# Patient Record
Sex: MALE | Race: WHITE | Employment: OTHER | URBAN - METROPOLITAN AREA
[De-identification: names, ages, dates, MRNs, and addresses within clinical notes are randomized per-mention and may not be internally consistent; named-entity substitution may affect disease eponyms.]

---

## 2020-03-03 ENCOUNTER — HOSPITAL ENCOUNTER (EMERGENCY)
Age: 65
Discharge: ANOTHER HOSPITAL | End: 2020-03-04
Attending: EMERGENCY MEDICINE
Payer: MEDICAID

## 2020-03-03 ENCOUNTER — APPOINTMENT (OUTPATIENT)
Dept: CT IMAGING | Age: 65
End: 2020-03-03
Payer: MEDICAID

## 2020-03-03 ENCOUNTER — APPOINTMENT (OUTPATIENT)
Dept: GENERAL RADIOLOGY | Age: 65
End: 2020-03-03
Payer: MEDICAID

## 2020-03-03 VITALS
TEMPERATURE: 97.9 F | OXYGEN SATURATION: 96 % | SYSTOLIC BLOOD PRESSURE: 187 MMHG | HEART RATE: 71 BPM | DIASTOLIC BLOOD PRESSURE: 89 MMHG | RESPIRATION RATE: 14 BRPM | HEIGHT: 70 IN

## 2020-03-03 LAB
A/G RATIO: 1.6 (ref 1.1–2.2)
ALBUMIN SERPL-MCNC: 4.1 G/DL (ref 3.4–5)
ALP BLD-CCNC: 113 U/L (ref 40–129)
ALT SERPL-CCNC: 27 U/L (ref 10–40)
ANION GAP SERPL CALCULATED.3IONS-SCNC: 10 MMOL/L (ref 3–16)
APTT: 32.5 SEC (ref 24.2–36.2)
AST SERPL-CCNC: 26 U/L (ref 15–37)
BASOPHILS ABSOLUTE: 0.1 K/UL (ref 0–0.2)
BASOPHILS RELATIVE PERCENT: 1.2 %
BILIRUB SERPL-MCNC: 0.3 MG/DL (ref 0–1)
BUN BLDV-MCNC: 8 MG/DL (ref 7–20)
CALCIUM SERPL-MCNC: 8.9 MG/DL (ref 8.3–10.6)
CHLORIDE BLD-SCNC: 97 MMOL/L (ref 99–110)
CO2: 28 MMOL/L (ref 21–32)
CREAT SERPL-MCNC: 1 MG/DL (ref 0.8–1.3)
EKG ATRIAL RATE: 60 BPM
EKG DIAGNOSIS: NORMAL
EKG P AXIS: 44 DEGREES
EKG P-R INTERVAL: 142 MS
EKG Q-T INTERVAL: 444 MS
EKG QRS DURATION: 104 MS
EKG QTC CALCULATION (BAZETT): 444 MS
EKG R AXIS: 0 DEGREES
EKG T AXIS: 24 DEGREES
EKG VENTRICULAR RATE: 60 BPM
EOSINOPHILS ABSOLUTE: 0.4 K/UL (ref 0–0.6)
EOSINOPHILS RELATIVE PERCENT: 4.3 %
GFR AFRICAN AMERICAN: >60
GFR NON-AFRICAN AMERICAN: >60
GLOBULIN: 2.6 G/DL
GLUCOSE BLD-MCNC: 230 MG/DL (ref 70–99)
GLUCOSE BLD-MCNC: 264 MG/DL (ref 70–99)
HCT VFR BLD CALC: 42.8 % (ref 40.5–52.5)
HEMOGLOBIN: 14.4 G/DL (ref 13.5–17.5)
INR BLD: 0.97 (ref 0.86–1.14)
LYMPHOCYTES ABSOLUTE: 2.3 K/UL (ref 1–5.1)
LYMPHOCYTES RELATIVE PERCENT: 25.3 %
MCH RBC QN AUTO: 30.6 PG (ref 26–34)
MCHC RBC AUTO-ENTMCNC: 33.7 G/DL (ref 31–36)
MCV RBC AUTO: 90.8 FL (ref 80–100)
MONOCYTES ABSOLUTE: 0.7 K/UL (ref 0–1.3)
MONOCYTES RELATIVE PERCENT: 7.7 %
NEUTROPHILS ABSOLUTE: 5.6 K/UL (ref 1.7–7.7)
NEUTROPHILS RELATIVE PERCENT: 61.5 %
PDW BLD-RTO: 13.1 % (ref 12.4–15.4)
PERFORMED ON: ABNORMAL
PLATELET # BLD: 220 K/UL (ref 135–450)
PMV BLD AUTO: 8.5 FL (ref 5–10.5)
POTASSIUM SERPL-SCNC: 4.3 MMOL/L (ref 3.5–5.1)
PROTHROMBIN TIME: 11.2 SEC (ref 10–13.2)
RBC # BLD: 4.71 M/UL (ref 4.2–5.9)
SODIUM BLD-SCNC: 135 MMOL/L (ref 136–145)
TOTAL PROTEIN: 6.7 G/DL (ref 6.4–8.2)
WBC # BLD: 9.1 K/UL (ref 4–11)

## 2020-03-03 PROCEDURE — 70450 CT HEAD/BRAIN W/O DYE: CPT

## 2020-03-03 PROCEDURE — 93005 ELECTROCARDIOGRAM TRACING: CPT | Performed by: EMERGENCY MEDICINE

## 2020-03-03 PROCEDURE — 6360000004 HC RX CONTRAST MEDICATION: Performed by: EMERGENCY MEDICINE

## 2020-03-03 PROCEDURE — 85730 THROMBOPLASTIN TIME PARTIAL: CPT

## 2020-03-03 PROCEDURE — 80053 COMPREHEN METABOLIC PANEL: CPT

## 2020-03-03 PROCEDURE — 70496 CT ANGIOGRAPHY HEAD: CPT

## 2020-03-03 PROCEDURE — 71045 X-RAY EXAM CHEST 1 VIEW: CPT

## 2020-03-03 PROCEDURE — 99285 EMERGENCY DEPT VISIT HI MDM: CPT

## 2020-03-03 PROCEDURE — 85025 COMPLETE CBC W/AUTO DIFF WBC: CPT

## 2020-03-03 PROCEDURE — 6370000000 HC RX 637 (ALT 250 FOR IP): Performed by: EMERGENCY MEDICINE

## 2020-03-03 PROCEDURE — 85610 PROTHROMBIN TIME: CPT

## 2020-03-03 RX ORDER — GABAPENTIN 100 MG/1
100 CAPSULE ORAL ONCE
Status: COMPLETED | OUTPATIENT
Start: 2020-03-03 | End: 2020-03-03

## 2020-03-03 RX ORDER — ROPINIROLE 0.5 MG/1
0.25 TABLET, FILM COATED ORAL ONCE
Status: COMPLETED | OUTPATIENT
Start: 2020-03-03 | End: 2020-03-03

## 2020-03-03 RX ORDER — NICOTINE 21 MG/24HR
1 PATCH, TRANSDERMAL 24 HOURS TRANSDERMAL DAILY
Status: DISCONTINUED | OUTPATIENT
Start: 2020-03-03 | End: 2020-03-04 | Stop reason: HOSPADM

## 2020-03-03 RX ADMIN — ROPINIROLE HYDROCHLORIDE 0.25 MG: 0.5 TABLET, FILM COATED ORAL at 18:56

## 2020-03-03 RX ADMIN — IOPAMIDOL 75 ML: 755 INJECTION, SOLUTION INTRAVENOUS at 13:27

## 2020-03-03 RX ADMIN — GABAPENTIN 100 MG: 100 CAPSULE ORAL at 18:56

## 2020-03-03 NOTE — ED PROVIDER NOTES
[facility]  ED PROVIDER NOTE    Patient Identification  Pt Name: Jonathan Palacios  MRN: [id number]  [place] 1955  Date of evaluation: 3/3/2020  Provider: Lake Carrera MD  PCP: Nathaniel Livingston MD    Chief Complaint  Facial Droop (at noon started with slurred speech, facial droop on left side, and left side numbness. Currently back to baseline. )      HPI  (History provided by patient and spouse/SO)  This is a 62 y.o. male who was brought in by self for sudden onset of left facial numbness, left facial droop, and slurred speech. He was at [facility] when this happened. It occurred less than 1 hour prior to arrival.  By the time he arrived here, his symptoms completely resolved. He denies any numbness, tingling, or focal weakness. His speech is back to normal, confirmed by both the patient and his wife. He is not having confusion or vision changes/loss. He denies headache, nausea, and vomiting. He is not feeling dizzy. He has not had gait disturbances coordination deficits. He has had similar symptoms in the past and was told he was having TIAs. He was told he had a blockage in an artery in his brain that was too distal to fix. He denies having any associated chest pain, shortness of breath, sweating, lightheadedness, or other symptoms. ROS   10 systems reviewed, pertinent positives/negatives per HPI otherwise noted to be negative. I have reviewed the following nursing documentation:  Allergies: Diazepam    Past medical history:   Past Medical History:   Diagnosis Date    Hyperlipidemia     Hypertension     TIA (transient ischemic attack)      Past surgical history:   Past Surgical History:   Procedure Laterality Date    ABDOMEN SURGERY         Home medications:   Previous Medications    No medications on file       Social history:  reports that he has been smoking. He has been smoking about 0.50 packs per day. He has never used smokeless tobacco. He reports current alcohol use. He reports that he does not use drugs. Family history:  No family history on file. Exam  ED Triage Vitals   BP Temp Temp Source Pulse Resp SpO2 Height Weight   03/03/20 1309 03/03/20 1305 03/03/20 1305 03/03/20 1305 03/03/20 1305 03/03/20 1305 03/03/20 1305 --   123/71 98.4 °F (36.9 °C) Oral 60 18 97 % 5' 10\" (1.778 m)      Nursing note and vitals reviewed. Constitutional: Well developed, well nourished. Non-toxic in appearance. HENT:      Head: Normocephalic and atraumatic. Ears: External ears normal.      Nose: Nose normal.     Mouth: Membrane mucosa moist and pink. Eyes: Anicteric sclera. No discharge. No visual field deficits as tested by confrontation in each eye individually. Neck: Supple. Trachea midline. Cardiovascular: RRR; no murmurs, rubs, or gallops. Pulmonary/Chest: Effort normal. No respiratory distress. CTAB. No stridor. No wheezes. No rales. Abdominal: Soft. No distension. Musculoskeletal: Moves all extremities. No gross deformity. Neurological: Alert and oriented to person, place, time, and situation. CN II-XII intact as tested. Strength 5/5 in upper and lower extremities bilaterally. Normal reflexes. No pronator drift. Normal sensation to light touch. Normal finger-nose bilaterally. Normal heel-shin bilaterally. No speech difficulties or slurring. Skin: Warm and dry. No rash. Psychiatric: Normal mood and affect. Behavior is normal.    EKG  The Ekg interpreted by me in the absence of a cardiologist shows. normal sinus rhythm with a rate of 60  Axis is   Left axis deviation  QTc is  normal  Intervals and Durations are unremarkable. No specific ST-T wave changes appreciated. No evidence of acute ischemia. No significant change from prior EKG dated 9/30/2012    Radiology  XR CHEST PORTABLE   Final Result   1. Coarse reticular opacities throughout the lungs, slightly more pronounced   since 2012. Findings are favored to represent chronic lung changes.    Superimposed INTRACRANIAL HEMORRHAGE,SUBARACHNOID HEMORRHAGE, SUBDURAL HEMATOMA, STROKE, TIA, MENINGITIS, ENCEPHALITIS, SPINAL CORD INJURY/COMPRESSION/LESION, VERTEBROBASILAR INSUFFICIENCY    The total Critical Care time is 45 minutes which excludes separately billable procedures. Final Impression  1. Transient ischemic attack, acute    2. Vertebral artery occlusion, right    3. More than 50 percent stenosis of right internal carotid artery        Blood pressure (!) 167/87, pulse 69, temperature 97.9 °F (36.6 °C), temperature source Oral, resp. rate 18, height 5' 10\" (1.778 m), SpO2 98 %. Disposition:  Transfer    This chart was generated using the [address] [place] [address] dictation system. I created this record but it may contain dictation errors given the limitations of this technology.         Deanna Burrell MD  03/03/20 3597

## 2020-03-04 NOTE — ED NOTES
transfer center called @ 2025   stated \"still waiting for a bed to be cleaned like we told you before will call you when it is done\"  Charge RN made aware.             Jacey Cole  03/03/20 2101

## 2020-03-04 NOTE — ED NOTES
Called  transfer center @ 1910 for bed update. Transfer center stated they are waiting for bed to be cleaned and will call us when is ready.      Jacey Cole  03/03/20 1921

## 2020-03-04 NOTE — ED NOTES
Bedside report to [facility] transport. Patient departed from ED in stable condition. IV in RAC remains in place.      Aranza Walters RN  03/03/20 5100

## 2020-05-19 NOTE — ED NOTES
transfer center called @ 2139 with bed assignment and report number. Paperwork, ETA of [facility], and report number given to RN. Pt updated of time.      Jacey Cole  03/03/20 7422 This visit was conducted using telemedicine with live interactive video and audio   Patient verbally consents to conduct video visit   Patient understands and accepts financial responsibility for any deductible, co-insurance and/or co-pays associated with LITE TEST In Vitro Strip USE TWICE DAILY AS DIRECTED (Patient not taking: Reported on 2/18/2020) 100 strip 0   • FREESTYLE SYSTEM Does not apply Kit 1 each by Does not apply route as needed for Other.  (Patient not taking: Reported on 2/18/2020 ) 1 kit 0 high risk has uncontrolled dm and still working   · Advised to self isolate for 2 days and until results of covid 19 is back   · symptomatic treatment   · Avoid nsaids until results of COVID test   · Starting Augmentin x 10 days       Meds & Refills for th

## 2021-10-15 NOTE — ED NOTES
1 - called  stroke team per consult  Re: multiple partial occlusions in patient with TIA today  1512 - Dr Ruy Gardner called back to speak with Dr Lily Oviedo  03/03/20 1001 Referring Physician (Optional): Kali Sánchez MD

## 2022-08-29 ENCOUNTER — OFFICE VISIT (OUTPATIENT)
Dept: ORTHOPEDIC SURGERY | Age: 67
End: 2022-08-29
Payer: MEDICARE

## 2022-08-29 VITALS — HEIGHT: 70 IN

## 2022-08-29 DIAGNOSIS — M25.552 BILATERAL HIP PAIN: Primary | ICD-10-CM

## 2022-08-29 DIAGNOSIS — M25.551 BILATERAL HIP PAIN: Primary | ICD-10-CM

## 2022-08-29 PROCEDURE — 99203 OFFICE O/P NEW LOW 30 MIN: CPT | Performed by: ORTHOPAEDIC SURGERY

## 2022-08-29 PROCEDURE — 1123F ACP DISCUSS/DSCN MKR DOCD: CPT | Performed by: ORTHOPAEDIC SURGERY

## 2022-08-29 NOTE — Clinical Note
Sending this patient your way for evaluation of his right hip and determine whether or not he may be a candidate for DEEPALI.   Thanks

## 2022-08-30 NOTE — PROGRESS NOTES
8/29/2022     Reason for visit:  Bilateral hip pain    History of Present Illness: The patient is a 81-year-old male who presents for evaluation of his bilateral hips. He reports that he has had pain for several months to years. The right side bothers him more so than the left. No traumatic injury. He has been treated with activity modification, anti-inflammatory medications, and physical therapy but has remained symptomatic. The pain is daily made worse with standing, walking, stairs. He localizes the pain to the anterior and deep aspect of the hip as well as the groin region. Medical History:  Past Medical History:   Diagnosis Date    Hyperlipidemia     Hypertension     TIA (transient ischemic attack)       Past Surgical History:   Procedure Laterality Date    ABDOMINAL SURGERY        No family history on file. Social History     Socioeconomic History    Marital status:      Spouse name: Not on file    Number of children: Not on file    Years of education: Not on file    Highest education level: Not on file   Occupational History    Not on file   Tobacco Use    Smoking status: Every Day     Packs/day: 0.50     Types: Cigarettes    Smokeless tobacco: Never   Substance and Sexual Activity    Alcohol use: Yes     Comment: socially    Drug use: Never    Sexual activity: Not on file   Other Topics Concern    Not on file   Social History Narrative    Not on file     Social Determinants of Health     Financial Resource Strain: Not on file   Food Insecurity: Not on file   Transportation Needs: Not on file   Physical Activity: Not on file   Stress: Not on file   Social Connections: Not on file   Intimate Partner Violence: Not on file   Housing Stability: Not on file      No current outpatient medications on file prior to visit. No current facility-administered medications on file prior to visit.       Allergies   Allergen Reactions    Diazepam         Review of Systems:  Constitutional: Patient is adequately groomed with no evidence of malnutrition  Mental Status: The patient is oriented to time, place and person. The patient's mood and affect are appropriate. Lymphatic: The lymphatic examination bilaterally reveals all areas to be without enlargement or induration. Vascular: Examination reveals no swelling or calf tenderness. Peripheral pulses are palpable and 2+. Neurological: The patient has good coordination. There is no weakness or sensory deficit. Skin:  Head/Neck: inspection reveals no rashes, ulcerations or lesions. Trunk: inspection reveals no rashes, ulcerations or lesions. Objective:  Ht 5' 10\" (1.778 m)      Physical Exam:  The patient is well-appearing and in no apparent distress  Examination of the left and right hips  Range of motion reveals 90 degrees of flexion with internal rotation of 5 to 10 degrees which causes pain, external rotation of 30 to 40 degrees, no trochanteric tenderness  5 out of 5 strength throughout distal muscle groups  Sensation is intact to light touch throughout all distributions  There is no calf swelling or tenderness  Palpable DP pulse, brisk cap refill, 2+ symmetric reflexes     Imaging:  AP pelvis x-ray as well as 2 view x-rays of the right and left hips were reviewed. There is no Acacian. Irregularity of the right femoral head consistent with suspected subchondral collapse likely as result of AVN. Left hip sphericity is maintained. Assessment:  Bilateral hip pain. Right worse than left. X-ray evidence of degenerative joint disease as result of likely subchondral collapse due to AVN    Plan:  Discussed with the patient the diagnosis and treatment options. We spent time reviewing the imaging findings. Given the subchondral collapse I do feel that the only treatment option that would benefit the patient at this point would be a total hip arthroplasty on the right side. He does understand this.   As result I will send him to [facility] for further evaluation and determine whether or not he may be a candidate for surgical intervention. Greater than 30 minutes were spent with this encounter. Time spent included evaluating the patient's chart prior to arrival.  Evaluating the patient in the office including history, physical examination, imaging reviewing, and counseling on next steps. Lastly, time was spent discussing orders with my staff as well as providing documentation in the chart. Lizzie Phelps MD            Orthopaedic Surgery Sports Medicine and [address] [facility] and [address] [facility] Physician [facility] ([place]      Disclaimer: This note was dictated with voice recognition software. Though review and correction are routine, we apologize for any errors.

## 2022-09-09 ENCOUNTER — OFFICE VISIT (OUTPATIENT)
Dept: ORTHOPEDIC SURGERY | Age: 67
End: 2022-09-09
Payer: MEDICARE

## 2022-09-09 VITALS — HEIGHT: 70 IN | WEIGHT: 189 LBS | BODY MASS INDEX: 27.06 KG/M2

## 2022-09-09 DIAGNOSIS — M25.551 BILATERAL HIP PAIN: Primary | ICD-10-CM

## 2022-09-09 DIAGNOSIS — M87.059 AVASCULAR NECROSIS OF FEMUR, UNSPECIFIED LATERALITY (HCC): ICD-10-CM

## 2022-09-09 DIAGNOSIS — M25.552 BILATERAL HIP PAIN: Primary | ICD-10-CM

## 2022-09-09 PROCEDURE — 1123F ACP DISCUSS/DSCN MKR DOCD: CPT | Performed by: ORTHOPAEDIC SURGERY

## 2022-09-09 PROCEDURE — 99213 OFFICE O/P EST LOW 20 MIN: CPT | Performed by: ORTHOPAEDIC SURGERY

## 2022-09-09 NOTE — PROGRESS NOTES
Dr Stella Montiel      Date /Time 9/9/2022       12:54 PM EDT  Name Divine Kemp             1955   Location  [facility]  MRN [id number]                Chief Complaint   Patient presents with    Hip Pain     B/l hips        History of Present Illness    Divine Kemp is a 73 y.o. male who presents with  bilateral hip pain. Sent in consultation by Tania Caballero MD,. Athletic/exercise activity: no sports. Injury Mechanism:  none. Worker's Comp. & legal issues:   none. Previous Treatments: Ice, Heat, and NSAIDs    Patient presents the office today for a new problem. Patient is here with a chief complaint of right greater than left hip pain. He has had pain for many years. No specific injury or trauma. Pain concentrated over his groin and lateral hip. Patient has had very extensive past medical history including but not limited to smoker, multiple cardiac and lower extremity stents, Plavix, TIA, diabetic, peripheral vascular disease, and intracranial aneurysms. Patient has already been informed by a another surgeon at [facility] that he is simply not a surgical candidate. Past History  Past Medical History:   Diagnosis Date    Hyperlipidemia     Hypertension     TIA (transient ischemic attack)      Past Surgical History:   Procedure Laterality Date    ABDOMEN SURGERY       History reviewed. No pertinent family history. Social History     Tobacco Use    Smoking status: Every Day     Packs/day: 0.50     Types: Cigarettes    Smokeless tobacco: Never   Substance Use Topics    Alcohol use: Yes     Comment: socially      No current outpatient medications on file prior to visit. No current facility-administered medications on file prior to visit. ASCVD 10-YEAR RISK SCORE  The ASCVD Risk score (Chiquis Gregory, et al., 2013) failed to calculate for the following reasons:     The systolic blood pressure is missing    Cannot find a previous HDL lab    Cannot find a previous total apprehension  []  []Not tested   []  []Not tested    Uncontained Internal rotation  []  []Not tested  []  []Not tested          Abductors  [x] All Neg  [] Not tested   [x] All Neg  [] Not tested    Medius strength  []  []Not tested   []  []Not tested    Minimum strength  []  []Not tested   []  []Not tested    IT band tendonitis  []  []Not tested   []  []Not tested    Trochanteric tenderness  []  []Not tested  []  []Not tested   Sciatic neuropathic pain  []  []Not tested   []  []Not tested           Post-arthroplasty  [] All Neg  [] Not tested   [] All Neg  [] Not tested    Rectus tendonitis  []  []Not tested   []  []Not tested    Iliopsoas tendonitis       Start-up pain  []  []Not tested   []  []Not tested          Imaging    Bilateral Hip: [address] [facility]  Radiographs: X-rays were ordered today and reviewed of both the right and left hips. 4 views. AP pelvis, lateral, and false profile. They demonstrate right greater than left AVN of the femoral head. The right side does have collapse. Procedure:  Orders Placed This Encounter   Procedures    XR PELVIS (1-2 VIEWS)     Standing Status:   Future     Number of Occurrences:   1     Standing Expiration Date:   9/9/2023       Assessment and Plan  Patrice Tse was seen today for hip pain. Diagnoses and all orders for this visit:    Bilateral hip pain  -     XR PELVIS (1-2 VIEWS); Future    Avascular necrosis of femur, unspecified laterality ([facility] [address]      Patient simply is not a surgical candidate. His medical history places him at too high risk of any surgery. I am in agreement with the surgeon from [address] [place] [address]. He will follow-up on appearing basis.     I discussed with Vitaliy Snell that his history, symptoms, signs, and imaging are most consistent with avascular necrosis    We reviewed the natural history of these conditions and treatment options ranging from conservative measures (rest, icing, activity modification, physical therapy, pain meds, cortisone injection) to surgical options. In terms of treatment, I recommended continuing with rest, icing, avoidance of painful activities, NSAIDs or pain meds as tolerated, and physical therapy. If these are not effective, cortisone injection can be considered. We discussed surgical options as well, should conservative measures fail. Electronically signed by Es Amador MD on 9/9/2022 at 12:54 PM  This dictation was generated by voice recognition computer software. Although all attempts are made to edit the dictation for accuracy, there may be errors in the transcription that are not intended.